# Patient Record
Sex: MALE | Race: WHITE | NOT HISPANIC OR LATINO | URBAN - METROPOLITAN AREA
[De-identification: names, ages, dates, MRNs, and addresses within clinical notes are randomized per-mention and may not be internally consistent; named-entity substitution may affect disease eponyms.]

---

## 2019-02-22 ENCOUNTER — OFFICE VISIT (OUTPATIENT)
Dept: INTERNAL MEDICINE | Facility: CLINIC | Age: 29
End: 2019-02-22

## 2019-02-22 VITALS
OXYGEN SATURATION: 99 % | RESPIRATION RATE: 20 BRPM | BODY MASS INDEX: 21.77 KG/M2 | HEIGHT: 69 IN | WEIGHT: 147 LBS | TEMPERATURE: 96.8 F | SYSTOLIC BLOOD PRESSURE: 98 MMHG | DIASTOLIC BLOOD PRESSURE: 72 MMHG | HEART RATE: 75 BPM

## 2019-02-22 DIAGNOSIS — Z00.00 ANNUAL PHYSICAL EXAM: Primary | ICD-10-CM

## 2019-02-22 LAB
25(OH)D3 SERPL-MCNC: 35.4 NG/ML
ALBUMIN SERPL-MCNC: 4.52 G/DL (ref 3.2–4.8)
ALBUMIN/GLOB SERPL: 2.1 G/DL (ref 1.5–2.5)
ALP SERPL-CCNC: 112 U/L (ref 25–100)
ALT SERPL W P-5'-P-CCNC: 28 U/L (ref 7–40)
ANION GAP SERPL CALCULATED.3IONS-SCNC: 11 MMOL/L (ref 3–11)
ARTICHOKE IGE QN: 93 MG/DL (ref 0–130)
AST SERPL-CCNC: 19 U/L (ref 0–33)
BASOPHILS # BLD AUTO: 0.03 10*3/MM3 (ref 0–0.2)
BASOPHILS NFR BLD AUTO: 0.6 % (ref 0–1)
BILIRUB SERPL-MCNC: 0.7 MG/DL (ref 0.3–1.2)
BUN BLD-MCNC: 12 MG/DL (ref 9–23)
BUN/CREAT SERPL: 12.4 (ref 7–25)
CALCIUM SPEC-SCNC: 9.3 MG/DL (ref 8.7–10.4)
CHLORIDE SERPL-SCNC: 104 MMOL/L (ref 99–109)
CHOLEST SERPL-MCNC: 152 MG/DL (ref 0–200)
CO2 SERPL-SCNC: 24 MMOL/L (ref 20–31)
CREAT BLD-MCNC: 0.97 MG/DL (ref 0.6–1.3)
DEPRECATED RDW RBC AUTO: 39.5 FL (ref 37–54)
EOSINOPHIL # BLD AUTO: 0.18 10*3/MM3 (ref 0–0.3)
EOSINOPHIL NFR BLD AUTO: 3.4 % (ref 0–3)
ERYTHROCYTE [DISTWIDTH] IN BLOOD BY AUTOMATED COUNT: 13 % (ref 11.3–14.5)
GFR SERPL CREATININE-BSD FRML MDRD: 92 ML/MIN/1.73
GLOBULIN UR ELPH-MCNC: 2.2 GM/DL
GLUCOSE BLD-MCNC: 107 MG/DL (ref 70–100)
HBA1C MFR BLD: 4.7 % (ref 4.8–5.6)
HCT VFR BLD AUTO: 44.1 % (ref 38.9–50.9)
HDLC SERPL-MCNC: 43 MG/DL (ref 40–60)
HGB BLD-MCNC: 14.9 G/DL (ref 13.1–17.5)
IMM GRANULOCYTES # BLD AUTO: 0.02 10*3/MM3 (ref 0–0.05)
IMM GRANULOCYTES NFR BLD AUTO: 0.4 % (ref 0–0.6)
IRON 24H UR-MRATE: 87 MCG/DL (ref 50–175)
IRON SATN MFR SERPL: 27 % (ref 20–50)
LYMPHOCYTES # BLD AUTO: 1.3 10*3/MM3 (ref 0.6–4.8)
LYMPHOCYTES NFR BLD AUTO: 24.9 % (ref 24–44)
MCH RBC QN AUTO: 28.4 PG (ref 27–31)
MCHC RBC AUTO-ENTMCNC: 33.8 G/DL (ref 32–36)
MCV RBC AUTO: 84.2 FL (ref 80–99)
MONOCYTES # BLD AUTO: 0.45 10*3/MM3 (ref 0–1)
MONOCYTES NFR BLD AUTO: 8.6 % (ref 0–12)
NEUTROPHILS # BLD AUTO: 3.27 10*3/MM3 (ref 1.5–8.3)
NEUTROPHILS NFR BLD AUTO: 62.5 % (ref 41–71)
PLATELET # BLD AUTO: 242 10*3/MM3 (ref 150–450)
PMV BLD AUTO: 10.7 FL (ref 6–12)
POTASSIUM BLD-SCNC: 4 MMOL/L (ref 3.5–5.5)
PROT SERPL-MCNC: 6.7 G/DL (ref 5.7–8.2)
RBC # BLD AUTO: 5.24 10*6/MM3 (ref 4.2–5.76)
SODIUM BLD-SCNC: 139 MMOL/L (ref 132–146)
TIBC SERPL-MCNC: 319 MCG/DL (ref 250–450)
TRIGL SERPL-MCNC: 76 MG/DL (ref 0–150)
TSH SERPL DL<=0.05 MIU/L-ACNC: 3.11 MIU/ML (ref 0.35–5.35)
VIT B12 BLD-MCNC: 634 PG/ML (ref 211–911)
WBC NRBC COR # BLD: 5.23 10*3/MM3 (ref 3.5–10.8)

## 2019-02-22 PROCEDURE — 80061 LIPID PANEL: CPT | Performed by: NURSE PRACTITIONER

## 2019-02-22 PROCEDURE — 99385 PREV VISIT NEW AGE 18-39: CPT | Performed by: NURSE PRACTITIONER

## 2019-02-22 PROCEDURE — 82306 VITAMIN D 25 HYDROXY: CPT | Performed by: NURSE PRACTITIONER

## 2019-02-22 PROCEDURE — 84443 ASSAY THYROID STIM HORMONE: CPT | Performed by: NURSE PRACTITIONER

## 2019-02-22 PROCEDURE — 85025 COMPLETE CBC W/AUTO DIFF WBC: CPT | Performed by: NURSE PRACTITIONER

## 2019-02-22 PROCEDURE — 83036 HEMOGLOBIN GLYCOSYLATED A1C: CPT | Performed by: NURSE PRACTITIONER

## 2019-02-22 PROCEDURE — 83550 IRON BINDING TEST: CPT | Performed by: NURSE PRACTITIONER

## 2019-02-22 PROCEDURE — 80053 COMPREHEN METABOLIC PANEL: CPT | Performed by: NURSE PRACTITIONER

## 2019-02-22 PROCEDURE — 82607 VITAMIN B-12: CPT | Performed by: NURSE PRACTITIONER

## 2019-02-22 PROCEDURE — 83540 ASSAY OF IRON: CPT | Performed by: NURSE PRACTITIONER

## 2019-02-22 NOTE — PROGRESS NOTES
Subjective   ELPIDIO Leiva is a 29 y.o. male here today for annual    Chief Complaint   Patient presents with   • Annual Exam     His overall health is: good  He exercises by walking a few times a week  Immunizations are UTD  He does see his dentist   His diet is regular  He describes his alcohol intake as about 1 drink a week  He does not use tobacco  His cardiovascular risk is: low  He is sexually active.   He does not have ED or other bedroom issues.  He does wear a seatbelt regularly.  He does not wear sunscreen regularly when outdoors.    Review of Systems   Constitutional: Negative for activity change, appetite change, chills, fever, unexpected weight gain and unexpected weight loss.   HENT: Negative for congestion, ear pain, nosebleeds, postnasal drip, sore throat, trouble swallowing and voice change.    Eyes: Negative for blurred vision, pain, itching and visual disturbance.   Respiratory: Negative for cough and shortness of breath.    Cardiovascular: Negative for chest pain and palpitations.   Gastrointestinal: Negative for blood in stool, diarrhea, nausea and vomiting.   Endocrine: Negative for polydipsia, polyphagia and polyuria.   Genitourinary: Negative for dysuria, flank pain, frequency, genital sores, hematuria and urgency.   Musculoskeletal: Negative for arthralgias and myalgias.   Skin: Negative for rash and skin lesions.   Allergic/Immunologic: Negative for environmental allergies, food allergies and immunocompromised state.   Neurological: Negative for dizziness, seizures, weakness, headache, memory problem and confusion.   Psychiatric/Behavioral: Negative for self-injury, sleep disturbance, suicidal ideas and depressed mood. The patient is not nervous/anxious.        History reviewed. No pertinent past medical history.  History reviewed. No pertinent family history.  Past Surgical History:   Procedure Laterality Date   • WISDOM TOOTH EXTRACTION       Social History     Socioeconomic History  "  • Marital status:      Spouse name: Not on file   • Number of children: Not on file   • Years of education: Not on file   • Highest education level: Not on file   Social Needs   • Financial resource strain: Not on file   • Food insecurity - worry: Not on file   • Food insecurity - inability: Not on file   • Transportation needs - medical: Not on file   • Transportation needs - non-medical: Not on file   Occupational History   • Not on file   Tobacco Use   • Smoking status: Never Smoker   • Smokeless tobacco: Never Used   • Tobacco comment: 1   Substance and Sexual Activity   • Alcohol use: Yes   • Drug use: No   • Sexual activity: Not on file   Other Topics Concern   • Not on file   Social History Narrative   • Not on file       No current outpatient medications on file.    Objective   Vitals:    02/22/19 0827   BP: 98/72   Pulse: 75   Resp: 20   Temp: 96.8 °F (36 °C)   TempSrc: Temporal   SpO2: 99%   Weight: 66.7 kg (147 lb)   Height: 175.3 cm (69\")   PainSc: 0-No pain     Body mass index is 21.71 kg/m².    Physical Exam   Constitutional: He is oriented to person, place, and time. He appears well-developed and well-nourished. He is cooperative. No distress.   HENT:   Head: Normocephalic.   Right Ear: Tympanic membrane and external ear normal.   Left Ear: Tympanic membrane and external ear normal.   Nose: Nose normal. Right sinus exhibits no maxillary sinus tenderness and no frontal sinus tenderness. Left sinus exhibits no maxillary sinus tenderness and no frontal sinus tenderness.   Mouth/Throat: Oropharynx is clear and moist and mucous membranes are normal. No oropharyngeal exudate.   Eyes: Conjunctivae and EOM are normal. Pupils are equal, round, and reactive to light. No scleral icterus.   Neck: Normal range of motion. Neck supple. Carotid bruit is not present. No neck rigidity. No tracheal deviation present. No thyroid mass and no thyromegaly present.   Cardiovascular: Normal rate, regular rhythm, " normal heart sounds and intact distal pulses. Exam reveals no gallop and no friction rub.   No murmur heard.  Pulmonary/Chest: Effort normal and breath sounds normal. No respiratory distress. He has no wheezes. He has no rales.   Abdominal: Soft. Normal appearance and bowel sounds are normal. He exhibits no distension and no mass. There is no hepatosplenomegaly. There is no tenderness. There is no rebound and no guarding. No hernia.   Musculoskeletal: Normal range of motion. He exhibits no edema, tenderness or deformity.   ROM normal with all major joints   Lymphadenopathy:        Head (right side): No submental, no submandibular, no tonsillar, no preauricular, no posterior auricular and no occipital adenopathy present.        Head (left side): No submental, no submandibular, no tonsillar, no preauricular, no posterior auricular and no occipital adenopathy present.     He has no cervical adenopathy.        Right cervical: No superficial cervical, no deep cervical and no posterior cervical adenopathy present.       Left cervical: No superficial cervical, no deep cervical and no posterior cervical adenopathy present.   Neurological: He is alert and oriented to person, place, and time. He displays no atrophy and no tremor. No cranial nerve deficit or sensory deficit. He exhibits normal muscle tone. Coordination and gait normal. GCS eye subscore is 4. GCS verbal subscore is 5. GCS motor subscore is 6.   Skin: Skin is warm and dry. Capillary refill takes 2 to 3 seconds. No rash noted. He is not diaphoretic. No cyanosis. Nails show no clubbing.   Psychiatric: He has a normal mood and affect. His speech is normal and behavior is normal. Judgment and thought content normal. Cognition and memory are normal.   Nursing note and vitals reviewed.      Assessment/Plan   Problem List Items Addressed This Visit     None      Visit Diagnoses     Annual physical exam    -  Primary    Relevant Orders    CBC & Differential     Comprehensive Metabolic Panel    Hemoglobin A1c    TSH    Vitamin B12    Vitamin D 25 Hydroxy    Lipid Panel    Iron Profile          ELPIDIO was seen today for annual exam.    Diagnoses and all orders for this visit:    Annual physical exam  -     CBC & Differential  -     Comprehensive Metabolic Panel  -     Hemoglobin A1c  -     TSH  -     Vitamin B12  -     Vitamin D 25 Hydroxy  -     Lipid Panel  -     Iron Profile      Counseled regarding: age-appropriate screening labs and tests, wearing seatbelt and sunscreen regularly  Discussed: regular exercise and diet changes to keep a healthy weight, seeing a dermatologist for annual skin exams         Plan of care reviewed with the patient at the conclusion of today's visit.  Education was provided regarding diagnosis, management, and any prescribed or recommended OTC medications.  Patient verbalized understanding of and agreement with management plan.     Return in about 1 year (around 2/22/2020), or if symptoms worsen or fail to improve, for Annual.      Linda Proctor APRN